# Patient Record
Sex: FEMALE | Race: WHITE | Employment: UNEMPLOYED | URBAN - METROPOLITAN AREA
[De-identification: names, ages, dates, MRNs, and addresses within clinical notes are randomized per-mention and may not be internally consistent; named-entity substitution may affect disease eponyms.]

---

## 2018-09-21 ENCOUNTER — HOSPITAL ENCOUNTER (OUTPATIENT)
Dept: MAMMOGRAPHY | Age: 48
Discharge: HOME | End: 2018-09-26
Payer: COMMERCIAL

## 2018-09-21 DIAGNOSIS — Z12.31 VISIT FOR SCREENING MAMMOGRAM: ICD-10-CM

## 2018-09-21 PROCEDURE — 77067 SCR MAMMO BI INCL CAD: CPT

## 2020-11-21 ENCOUNTER — HOSPITAL ENCOUNTER (OUTPATIENT)
Dept: MAMMOGRAPHY | Age: 50
Discharge: HOME | End: 2020-11-21
Payer: COMMERCIAL

## 2020-11-21 PROCEDURE — 77063 BREAST TOMOSYNTHESIS BI: CPT

## 2020-12-03 ENCOUNTER — HOSPITAL ENCOUNTER (OUTPATIENT)
Dept: MAMMOGRAPHY | Age: 50
Discharge: HOME | End: 2020-12-03
Payer: COMMERCIAL

## 2020-12-03 ENCOUNTER — HOSPITAL ENCOUNTER (OUTPATIENT)
Dept: ULTRASOUND IMAGING | Age: 50
Discharge: HOME | End: 2020-12-03
Payer: COMMERCIAL

## 2020-12-03 PROCEDURE — G0279 TOMOSYNTHESIS, MAMMO: HCPCS

## 2020-12-03 PROCEDURE — 76882 US LMTD JT/FCL EVL NVASC XTR: CPT

## 2020-12-03 PROCEDURE — 76642 ULTRASOUND BREAST LIMITED: CPT

## 2022-05-16 ENCOUNTER — HOSPITAL ENCOUNTER (OUTPATIENT)
Dept: ULTRASOUND IMAGING | Age: 52
Discharge: HOME | End: 2022-05-16
Payer: COMMERCIAL

## 2022-05-16 DIAGNOSIS — R92.8 ABNORMAL MAMMOGRAM: ICD-10-CM

## 2022-05-16 PROCEDURE — 76882 US LMTD JT/FCL EVL NVASC XTR: CPT

## 2022-05-23 ENCOUNTER — HOSPITAL ENCOUNTER (OUTPATIENT)
Dept: MAMMOGRAPHY | Age: 52
Discharge: HOME | End: 2022-05-23
Payer: COMMERCIAL

## 2022-05-23 ENCOUNTER — HOSPITAL ENCOUNTER (OUTPATIENT)
Dept: ULTRASOUND IMAGING | Age: 52
Discharge: HOME | End: 2022-05-23
Payer: COMMERCIAL

## 2022-05-23 DIAGNOSIS — R92.8 ABNORMAL MAMMOGRAM OF RIGHT BREAST: ICD-10-CM

## 2022-05-23 DIAGNOSIS — R22.31 MASS OF RIGHT AXILLA: ICD-10-CM

## 2022-05-23 PROCEDURE — 2709999900 US BIOPSY LYMPH NODE

## 2022-05-23 PROCEDURE — 88341 IMHCHEM/IMCYTCHM EA ADD ANTB: CPT

## 2022-05-23 PROCEDURE — 88360 TUMOR IMMUNOHISTOCHEM/MANUAL: CPT

## 2022-05-23 PROCEDURE — 76942 ECHO GUIDE FOR BIOPSY: CPT

## 2022-05-23 PROCEDURE — 88342 IMHCHEM/IMCYTCHM 1ST ANTB: CPT

## 2022-05-23 PROCEDURE — 88305 TISSUE EXAM BY PATHOLOGIST: CPT

## 2022-05-23 PROCEDURE — 77065 DX MAMMO INCL CAD UNI: CPT

## 2022-05-23 PROCEDURE — 88365 INSITU HYBRIDIZATION (FISH): CPT

## 2022-06-01 ENCOUNTER — TELEPHONE (OUTPATIENT)
Dept: BREAST CENTER | Age: 52
End: 2022-06-01

## 2022-06-01 ENCOUNTER — INITIAL CONSULT (OUTPATIENT)
Dept: BREAST CENTER | Age: 52
End: 2022-06-01
Payer: COMMERCIAL

## 2022-06-01 VITALS
HEART RATE: 62 BPM | DIASTOLIC BLOOD PRESSURE: 49 MMHG | WEIGHT: 150 LBS | SYSTOLIC BLOOD PRESSURE: 106 MMHG | HEIGHT: 69 IN | BODY MASS INDEX: 22.22 KG/M2

## 2022-06-01 DIAGNOSIS — R59.0 LYMPHADENOPATHY, AXILLARY: Primary | ICD-10-CM

## 2022-06-01 PROCEDURE — 99204 OFFICE O/P NEW MOD 45 MIN: CPT | Performed by: SURGERY

## 2022-06-01 NOTE — LETTER
CONSENT TO OPERATION  AND/OR OTHER PROCEDURE(S)          PATIENT : Terese Ramirez OF BIRTH:  1970      DATE : 6/1/22          1. I request and consent that Dr. Gayathri Stafford,  and/or his associates or assistants perform an operation and/or procedures on the above patient at  [facility] [place], to treat the condition(s) which appear indicated by the diagnostic studies already performed. I have been told that in general terms the nature, purpose and reasonable expectations of the operation and/or procedure(s) are:       Right Axillary Lymph Node Excisional Biopsy        2. It has been explained to me by the informing physician that during the course of the operation and/or procedure(s) unforeseen conditions may be revealed that necessitate an extension of the original operation and/or procedure(s) or different operation and/or procedures than those set forth in Paragraph 1. I therefore authorize and request that my physician and/or his associates or assistants perform such operations and/or procedures as are necessary and desirable in the exercise of professional judgment. The authority granted under this Paragraph 2 shall extend to all conditions that require treatment and are known to my physician at the time the operation is commenced. 3. I have been made aware by the informing physician of certain risks and consequences that are associated with the operation and/or procedure(s) described in Paragraph 1, the reasonable alternative methods or treatment, the possible consequences, the possibility that the operation and/or procedure(s) may be unsuccessful and the possibility of complications.   I understand the reasonably known risks to be:      - Bleeding  - Infection/COVID 19  - Poor Healing  - Possible Damage to Nerve, Vessel, Tendon/Muscle or Bone  - Possible need for additional Treatment/Surgery/Re Excision  - Stiffness  - Persistent Pain   - Residual or Recurrent Symptoms  - Anesthetic and/or Medical Risks  - Possible failed operation  - Lymphedema/swelling  - Nipple loss  - Numbness or nerve injury  - Unappealing cosmetics              4. I have also been informed by the informing physician that there are other risks from both known and unknown causes that are attendant to the performance of any surgical procedure. I am aware that the practice of medicine and surgery is not an exact science, and that no guarantees have been made to me concerning the results of the operation and/or procedure(s). 5. I   CONSENT / REFUSE CONSENT  (strike the phrase that does not apply) to the taking of photographs before, during and/or after the operation or procedure for scientific/educational purposes. 6. I consent to the administration of anesthesia and to the use of such anesthetics as may be deemed advisable by the anesthesiologist who has been engaged by me or my physician. 7. I certify that I have read and understand the above consent to operation and/or other procedure(s); that the explanations therein referred to were made to me by the informing physician in advance of my signing this consent; that all blanks or statements requiring insertion or completion were filled in and inapplicable paragraphs, if any, were stricken before I signed; and that all questions asked by me about the operation and/or procedure(s) which I have consented to have been fully answered in a satisfactory manner.               _______________________  Witness        Signature Of Patient          _______________________  Informing Physician         Signature of Informing Physician           If patient is unable to sign or is a minor, complete one of the following:    (A)  Patient is a minor   years of age.     (B)  Patient is unable to sign because:

## 2022-06-01 NOTE — TELEPHONE ENCOUNTER
Breast History:  History of Previous Breast Biopsy:yes  Self Breast Exams Completed:yes-  Family History of Breast Cancer:  yes-mother breast cancer age of 76    Family History of Other Cancers:no-   Ashkenazi Yarsani Decent:no  Bra Size: 39 A    Gyne History:  : 3,   Para: 3  Age of Menarche: 16 years  Age of Menopause: No years   Age of first live Birth: 30 years  History of Hysterectomy / JENNIFER-BSO: No  History of OCP's/HRT:No   When and how long:  NO  Family History or personal history of Ovarian Cancer: no

## 2022-06-01 NOTE — PATIENT INSTRUCTIONS
Exam reveals right axillary mass  Procedure for excision was explained to patient  Patient to schedule history and physical with primary care physician for surgical clearance  Risks and benefits of surgery were explained  Consent for surgery was signed

## 2022-06-10 ENCOUNTER — TELEPHONE (OUTPATIENT)
Dept: SURGERY | Age: 52
End: 2022-06-10

## 2022-06-10 DIAGNOSIS — R59.0 LYMPHADENOPATHY, AXILLARY: Primary | ICD-10-CM

## 2022-06-10 PROCEDURE — 38525 BIOPSY/REMOVAL LYMPH NODES: CPT | Performed by: SURGERY

## 2022-06-10 NOTE — TELEPHONE ENCOUNTER
Spoke to Edmond. Notified her surgery is scheduled for 7/7/2022 @0730 arrive at [address] [place] [address] after midnight , no blood thinners or NSAIDS 5 days prior to surgery. Instructed to call with any questions or concerns prior to surgery. Pre-op physical is scheduled for 6/20/2022.       Thanks, Juanjose Soto

## 2022-06-15 ENCOUNTER — TELEPHONE (OUTPATIENT)
Dept: BREAST CENTER | Age: 52
End: 2022-06-15

## 2022-06-15 NOTE — TELEPHONE ENCOUNTER
Left a message to return my call. I scheduled pt for her post op appt on 7/27/22 @ 3151. Please verify date and time will work for pt.       Thanks, Jones Alcaraz

## 2022-06-17 NOTE — TELEPHONE ENCOUNTER
Called and spoke to patient. Informed her of her 07/27/22 post-op appointment in [facility] with Dr Aaliyah Saha @ 12:30. Patient informed me that if she needs to reschedule her appointment that she will call back to do so.

## 2022-07-01 RX ORDER — CHOLECALCIFEROL (VITAMIN D3) 125 MCG
1 CAPSULE ORAL PRN
COMMUNITY

## 2022-07-01 NOTE — PROGRESS NOTES
Preoperative Screening for Elective Surgery/Invasive Procedures While COVID-19 present in the community     1. Have you tested positive or have been told to self-isolate for COVID-19 like symptoms within the past 28 days? 2. Do you currently have any of the following symptoms? ? Fever >100.0 F or 99.9 F in immunocompromised patients? ? New onset cough, shortness of breath or difficulty breathing? ? New onset sore throat, myalgia (muscle aches and pains), headache, loss of taste/smell or diarrhea? 3. Have you had a potential exposure to COVID-19 within the past 14 days by:  ? Close contact with a confirmed case? ? Close contact with a healthcare worker,  or essential infrastructure worker (grocery store, [employer], gas station, public utilities or transportation)? ? Do you reside in a congregate setting such as; skilled nursing facility, adult home, correctional facility, homeless shelter or other institutional setting? ? Have you had recent travel to a known COVID-19 hotspot? No to all above       * Admitted with diarrhea? [] YES    [x]  NO     *Prior history of C-Diff. In last 3 months? [] YES    [x]  NO     *Antibiotic use in the past 6-8 weeks? [x]  NO    []  YES      If yes, which: REASON_________________     *Prior hospitalization or nursing home in the last month? []  YES    [x]  NO     SAFETY FIRST. .call before you fall    [facility] time_7/7/22 0600___________        Surgery time___0730_________    Do not eat or drink anything after 12:00 midnight prior to your surgery. This includes water chewing gum, mints and ice chips- the Day of Surgery. You may brush your teeth and gargle the morning of your surgery, but do not swallow the water     Please see your family doctor/pediatrician for a history and physical and/or questions concerning medications.    Bring any test results/reports from your physicians office. If you are under the care of a heart doctor or specialist doctor, please be aware that you may be asked to them for clearance    You may be asked to stop blood thinners such as Coumadin, Plavix, Fragmin, Lovenox, etc., or any anti-inflammatories such as:  Aspirin, Ibuprofen, Advil, Naproxen prior to your surgery. We also ask that you stop any OTC medications such as fish oil, vitamin E, glucosamine, garlic, Multivitamins, COQ 10, etc.    We ask that you do not smoke 24 hours prior to surgery  We ask that you do not  drink any alcoholic beverages 24 hours prior to surgery     You must make arrangements for a responsible adult to take you home after your surgery. For your safety you will not be allowed to leave alone or drive yourself home. Your surgery will be cancelled if you do not have a ride home. Also for your safety, it is strongly suggested that someone stay with you the first 24 hours after your surgery. A parent or legal guardian must accompany a child scheduled for surgery and plan to stay at the hospital until the child is discharged. Please do not bring other children with you. For your comfort, please wear simple loose fitting clothing to the hospital.  Please do not bring valuables. Do not wear any make-up or nail polish on your fingers or toes. For your safety, please do not wear any jewelry or body piercing's on the day of surgery. All jewelry must be removed. If you have dentures, they will be removed before going to operating room. For your convenience, we will provide you with a container. If you wear contact lenses or glasses, they will be removed, please bring a case for them. If you have a living will and a durable power of  for healthcare, please bring in a copy.      As part of our patient safety program to minimize surgical site infections, we ask you to do the following:    · Please notify your surgeon if you develop any illness between now and the day of your surgery. · This includes a cough, cold, fever, sore throat, nausea,         or vomiting, and diarrhea, etc.  ·  Please notify your surgeon if you experience dizziness, shortness         of breath or blurred vision between now and the time of your surgery. Do not shave your operative site 96 hours prior to surgery. For face and neck surgery, men may use an electric razor 48 hours   prior to surgery. You may shower the night before surgery or the morning of   your surgery with an antibacterial soap. You will need to bring a photo ID and insurance card     If you use a C-pap or Bi-pap machine, please bring your machine with you to the hospital     Our goal is to provide you with excellent care, therefore, visitors will be limited to so that we may focus on providing this care for you. Please contact your surgeon office, if you have any further questions. [facility] phone number:  [address] [facility] fax number:  [phone]    Please note these are generalized instructions for all surgical cases, you may be provided with more specific instructions according to your surgery.

## 2022-07-06 ENCOUNTER — ANESTHESIA EVENT (OUTPATIENT)
Dept: OPERATING ROOM | Age: 52
End: 2022-07-06
Payer: COMMERCIAL

## 2022-07-07 ENCOUNTER — HOSPITAL ENCOUNTER (OUTPATIENT)
Age: 52
Setting detail: OUTPATIENT SURGERY
Discharge: HOME | End: 2022-07-07
Attending: SURGERY | Admitting: SURGERY
Payer: COMMERCIAL

## 2022-07-07 ENCOUNTER — ANESTHESIA (OUTPATIENT)
Dept: OPERATING ROOM | Age: 52
End: 2022-07-07
Payer: COMMERCIAL

## 2022-07-07 VITALS
HEIGHT: 69 IN | BODY MASS INDEX: 21.68 KG/M2 | DIASTOLIC BLOOD PRESSURE: 61 MMHG | TEMPERATURE: 97.8 F | SYSTOLIC BLOOD PRESSURE: 113 MMHG | HEART RATE: 60 BPM | RESPIRATION RATE: 14 BRPM | OXYGEN SATURATION: 99 % | WEIGHT: 146.39 LBS

## 2022-07-07 DIAGNOSIS — R59.0 LYMPHADENOPATHY, AXILLARY: Primary | ICD-10-CM

## 2022-07-07 DIAGNOSIS — R59.0 AXILLARY LYMPHADENOPATHY: ICD-10-CM

## 2022-07-07 PROCEDURE — 2500000003 HC RX 250 WO HCPCS: Performed by: SURGERY

## 2022-07-07 PROCEDURE — 3700000000 HC ANESTHESIA ATTENDED CARE: Performed by: SURGERY

## 2022-07-07 PROCEDURE — 2500000003 HC RX 250 WO HCPCS

## 2022-07-07 PROCEDURE — 38525 BIOPSY/REMOVAL LYMPH NODES: CPT | Performed by: SURGERY

## 2022-07-07 PROCEDURE — 2580000003 HC RX 258: Performed by: SURGERY

## 2022-07-07 PROCEDURE — 3600000002 HC SURGERY LEVEL 2 BASE: Performed by: SURGERY

## 2022-07-07 PROCEDURE — 88184 FLOWCYTOMETRY/ TC 1 MARKER: CPT

## 2022-07-07 PROCEDURE — 88305 TISSUE EXAM BY PATHOLOGIST: CPT

## 2022-07-07 PROCEDURE — 2580000003 HC RX 258: Performed by: ANESTHESIOLOGY

## 2022-07-07 PROCEDURE — 6370000000 HC RX 637 (ALT 250 FOR IP): Performed by: SURGERY

## 2022-07-07 PROCEDURE — 6360000002 HC RX W HCPCS

## 2022-07-07 PROCEDURE — A4217 STERILE WATER/SALINE, 500 ML: HCPCS | Performed by: SURGERY

## 2022-07-07 PROCEDURE — 7100000000 HC PACU RECOVERY - FIRST 15 MIN: Performed by: SURGERY

## 2022-07-07 PROCEDURE — 7100000011 HC PHASE II RECOVERY - ADDTL 15 MIN: Performed by: SURGERY

## 2022-07-07 PROCEDURE — 7100000001 HC PACU RECOVERY - ADDTL 15 MIN: Performed by: SURGERY

## 2022-07-07 PROCEDURE — 2580000003 HC RX 258

## 2022-07-07 PROCEDURE — 3700000001 HC ADD 15 MINUTES (ANESTHESIA): Performed by: SURGERY

## 2022-07-07 PROCEDURE — 2720000010 HC SURG SUPPLY STERILE: Performed by: SURGERY

## 2022-07-07 PROCEDURE — 2709999900 HC NON-CHARGEABLE SUPPLY: Performed by: SURGERY

## 2022-07-07 PROCEDURE — 3600000012 HC SURGERY LEVEL 2 ADDTL 15MIN: Performed by: SURGERY

## 2022-07-07 PROCEDURE — 6360000002 HC RX W HCPCS: Performed by: SURGERY

## 2022-07-07 PROCEDURE — 88185 FLOWCYTOMETRY/TC ADD-ON: CPT

## 2022-07-07 PROCEDURE — 7100000010 HC PHASE II RECOVERY - FIRST 15 MIN: Performed by: SURGERY

## 2022-07-07 RX ORDER — PROPOFOL 10 MG/ML
INJECTION, EMULSION INTRAVENOUS PRN
Status: DISCONTINUED | OUTPATIENT
Start: 2022-07-07 | End: 2022-07-07 | Stop reason: SDUPTHER

## 2022-07-07 RX ORDER — SODIUM CHLORIDE 9 MG/ML
INJECTION, SOLUTION INTRAVENOUS CONTINUOUS
Status: DISCONTINUED | OUTPATIENT
Start: 2022-07-07 | End: 2022-07-07 | Stop reason: HOSPADM

## 2022-07-07 RX ORDER — HYDROCODONE BITARTRATE AND ACETAMINOPHEN 5; 325 MG/1; MG/1
1 TABLET ORAL EVERY 6 HOURS PRN
Qty: 5 TABLET | Refills: 0 | Status: SHIPPED | OUTPATIENT
Start: 2022-07-07 | End: 2022-07-10

## 2022-07-07 RX ORDER — FENTANYL CITRATE 50 UG/ML
25 INJECTION, SOLUTION INTRAMUSCULAR; INTRAVENOUS EVERY 5 MIN PRN
Status: CANCELLED | OUTPATIENT
Start: 2022-07-07

## 2022-07-07 RX ORDER — OXYCODONE HYDROCHLORIDE 5 MG/1
5 TABLET ORAL
Status: CANCELLED | OUTPATIENT
Start: 2022-07-07 | End: 2022-07-07

## 2022-07-07 RX ORDER — SODIUM CHLORIDE 0.9 % (FLUSH) 0.9 %
5-40 SYRINGE (ML) INJECTION PRN
Status: CANCELLED | OUTPATIENT
Start: 2022-07-07

## 2022-07-07 RX ORDER — BUPIVACAINE HYDROCHLORIDE 5 MG/ML
INJECTION, SOLUTION EPIDURAL; INTRACAUDAL
Status: COMPLETED | OUTPATIENT
Start: 2022-07-07 | End: 2022-07-07

## 2022-07-07 RX ORDER — DROPERIDOL 2.5 MG/ML
0.62 INJECTION, SOLUTION INTRAMUSCULAR; INTRAVENOUS
Status: CANCELLED | OUTPATIENT
Start: 2022-07-07 | End: 2022-07-07

## 2022-07-07 RX ORDER — SODIUM CHLORIDE 9 MG/ML
INJECTION, SOLUTION INTRAVENOUS PRN
Status: DISCONTINUED | OUTPATIENT
Start: 2022-07-07 | End: 2022-07-07 | Stop reason: HOSPADM

## 2022-07-07 RX ORDER — ONDANSETRON 2 MG/ML
4 INJECTION INTRAMUSCULAR; INTRAVENOUS
Status: CANCELLED | OUTPATIENT
Start: 2022-07-07 | End: 2022-07-07

## 2022-07-07 RX ORDER — SODIUM CHLORIDE 0.9 % (FLUSH) 0.9 %
5-40 SYRINGE (ML) INJECTION PRN
Status: DISCONTINUED | OUTPATIENT
Start: 2022-07-07 | End: 2022-07-07 | Stop reason: HOSPADM

## 2022-07-07 RX ORDER — ONDANSETRON 2 MG/ML
INJECTION INTRAMUSCULAR; INTRAVENOUS PRN
Status: DISCONTINUED | OUTPATIENT
Start: 2022-07-07 | End: 2022-07-07 | Stop reason: SDUPTHER

## 2022-07-07 RX ORDER — SODIUM CHLORIDE 0.9 % (FLUSH) 0.9 %
5-40 SYRINGE (ML) INJECTION EVERY 12 HOURS SCHEDULED
Status: DISCONTINUED | OUTPATIENT
Start: 2022-07-07 | End: 2022-07-07 | Stop reason: HOSPADM

## 2022-07-07 RX ORDER — SUCCINYLCHOLINE/SOD CL,ISO/PF 200MG/10ML
SYRINGE (ML) INTRAVENOUS PRN
Status: DISCONTINUED | OUTPATIENT
Start: 2022-07-07 | End: 2022-07-07 | Stop reason: SDUPTHER

## 2022-07-07 RX ORDER — ACETAMINOPHEN 325 MG/1
650 TABLET ORAL
Status: COMPLETED | OUTPATIENT
Start: 2022-07-07 | End: 2022-07-07

## 2022-07-07 RX ORDER — MAGNESIUM HYDROXIDE 1200 MG/15ML
LIQUID ORAL CONTINUOUS PRN
Status: DISCONTINUED | OUTPATIENT
Start: 2022-07-07 | End: 2022-07-07 | Stop reason: HOSPADM

## 2022-07-07 RX ORDER — LIDOCAINE HYDROCHLORIDE 20 MG/ML
INJECTION, SOLUTION EPIDURAL; INFILTRATION; INTRACAUDAL; PERINEURAL PRN
Status: DISCONTINUED | OUTPATIENT
Start: 2022-07-07 | End: 2022-07-07 | Stop reason: SDUPTHER

## 2022-07-07 RX ORDER — SODIUM CHLORIDE 9 MG/ML
INJECTION, SOLUTION INTRAVENOUS PRN
Status: CANCELLED | OUTPATIENT
Start: 2022-07-07

## 2022-07-07 RX ORDER — DEXAMETHASONE SODIUM PHOSPHATE 4 MG/ML
INJECTION, SOLUTION INTRA-ARTICULAR; INTRALESIONAL; INTRAMUSCULAR; INTRAVENOUS; SOFT TISSUE PRN
Status: DISCONTINUED | OUTPATIENT
Start: 2022-07-07 | End: 2022-07-07 | Stop reason: SDUPTHER

## 2022-07-07 RX ORDER — FENTANYL CITRATE 50 UG/ML
INJECTION, SOLUTION INTRAMUSCULAR; INTRAVENOUS PRN
Status: DISCONTINUED | OUTPATIENT
Start: 2022-07-07 | End: 2022-07-07 | Stop reason: SDUPTHER

## 2022-07-07 RX ORDER — SODIUM CHLORIDE 0.9 % (FLUSH) 0.9 %
5-40 SYRINGE (ML) INJECTION EVERY 12 HOURS SCHEDULED
Status: CANCELLED | OUTPATIENT
Start: 2022-07-07

## 2022-07-07 RX ORDER — MIDAZOLAM HYDROCHLORIDE 1 MG/ML
INJECTION INTRAMUSCULAR; INTRAVENOUS PRN
Status: DISCONTINUED | OUTPATIENT
Start: 2022-07-07 | End: 2022-07-07 | Stop reason: SDUPTHER

## 2022-07-07 RX ADMIN — PHENYLEPHRINE HYDROCHLORIDE 100 MCG: 10 INJECTION INTRAVENOUS at 07:44

## 2022-07-07 RX ADMIN — CEFAZOLIN 2000 MG: 2 INJECTION, POWDER, FOR SOLUTION INTRAMUSCULAR; INTRAVENOUS at 07:40

## 2022-07-07 RX ADMIN — PROPOFOL 70 MG: 10 INJECTION, EMULSION INTRAVENOUS at 07:38

## 2022-07-07 RX ADMIN — FENTANYL CITRATE 25 MCG: 50 INJECTION INTRAMUSCULAR; INTRAVENOUS at 07:48

## 2022-07-07 RX ADMIN — DEXAMETHASONE SODIUM PHOSPHATE 12 MG: 4 INJECTION, SOLUTION INTRAMUSCULAR; INTRAVENOUS at 07:42

## 2022-07-07 RX ADMIN — ONDANSETRON 4 MG: 2 INJECTION INTRAMUSCULAR; INTRAVENOUS at 07:42

## 2022-07-07 RX ADMIN — SODIUM CHLORIDE: 9 INJECTION, SOLUTION INTRAVENOUS at 06:30

## 2022-07-07 RX ADMIN — MIDAZOLAM 2 MG: 1 INJECTION INTRAMUSCULAR; INTRAVENOUS at 07:29

## 2022-07-07 RX ADMIN — PROPOFOL 180 MG: 10 INJECTION, EMULSION INTRAVENOUS at 07:34

## 2022-07-07 RX ADMIN — Medication 120 MG: at 07:38

## 2022-07-07 RX ADMIN — FENTANYL CITRATE 25 MCG: 50 INJECTION INTRAMUSCULAR; INTRAVENOUS at 07:34

## 2022-07-07 RX ADMIN — ACETAMINOPHEN 650 MG: 325 TABLET ORAL at 06:38

## 2022-07-07 RX ADMIN — LIDOCAINE HYDROCHLORIDE 60 MG: 20 INJECTION, SOLUTION EPIDURAL; INFILTRATION; INTRACAUDAL; PERINEURAL at 07:34

## 2022-07-07 ASSESSMENT — PAIN - FUNCTIONAL ASSESSMENT: PAIN_FUNCTIONAL_ASSESSMENT: 0-10

## 2022-07-07 ASSESSMENT — ENCOUNTER SYMPTOMS: SHORTNESS OF BREATH: 0

## 2022-07-07 ASSESSMENT — PAIN SCALES - WONG BAKER: WONGBAKER_NUMERICALRESPONSE: 0

## 2022-07-07 NOTE — ANESTHESIA POSTPROCEDURE EVALUATION
Department of Anesthesiology  Postprocedure Note    Patient: Georges Sahni  MRN: [id number]  YOB: 1970  Date of evaluation: 7/7/2022      Procedure Summary     Date: 07/07/22 Room / Location: [address]    Anesthesia Start: 7069 Anesthesia Stop: 2373    Procedure: RIGHT AXILLARY LYMPH NODE EXCISIONAL BIOPSY (Right Axilla) Diagnosis:       Axillary lymphadenopathy      (RIGHT AXILLARY LYMPHADENOPATHY)    Surgeons: Andrew Orozco MD Responsible Provider: Mario Torres MD    Anesthesia Type: general ASA Status: 2          Anesthesia Type: No value filed.     Sosa Phase I: Sosa Score: 10    Sosa Phase II: Sosa Score: 10      Anesthesia Post Evaluation    Patient location during evaluation: PACU  Patient participation: complete - patient participated  Level of consciousness: awake  Airway patency: patent  Nausea & Vomiting: no nausea and no vomiting  Cardiovascular status: blood pressure returned to baseline  Respiratory status: acceptable  Hydration status: stable  Multimodal analgesia pain management approach

## 2022-07-07 NOTE — PROGRESS NOTES
PACU Transfer Note    Vitals:    07/07/22 0840   BP: (!) 118/56   Pulse: 56   Resp: 13   Temp: 97.8 °F (36.6 °C)   SpO2: 100%       In: 650 [I.V.:600]  Out: 5     Pain assessment:  none    Report given to Receiving unit RN.    7/7/2022 8:43 AM      Electronically signed by Мария Herring RN on 7/7/2022 at 8:43 AM

## 2022-07-07 NOTE — OP NOTE
Operative Note      Patient: Yanely Aaron  YOB: 1970  MRN: [id number]    Date of Procedure: 7/7/2022    Pre-Op Diagnosis: RIGHT AXILLARY LYMPHADENOPATHY    Post-Op Diagnosis: Same       Procedure(s):  RIGHT AXILLARY LYMPH NODE EXCISIONAL BIOPSY    Surgeon(s): Sita Padron MD    Assistant:   Surgical Assistant: Chasity Mederos RN    Anesthesia: General    Estimated Blood Loss (mL): Minimal    Complications: None    Specimens:   ID Type Source Tests Collected by Time Destination   A : right axillary  mass Tissue Breast SURGICAL PATHOLOGY Sita Padron MD 7/7/2022 7685        Implants:  * No implants in log *      Drains: * No LDAs found *    Findings: see op note    Detailed Description of Procedure:   Operative Report      Name:  Yanely Aaron   MRN:  [id number]  Date:  7/7/2022        PREOPERATIVE DIAGNOSIS: right axillary lymph node    POSTOPERATIVE DIAGNOSIS: same    PROCEDURE:right axillary lymph node excisional biopsy    SURGEON: Gabriella    ESTIMATED BLOOD LOSS:  Less than 25 mL    ASSISTANT: DAVID Obrien    ANESTHESIA: General    INDICATIONS FOR PROCEDURE: The patient is a 47 y.o. female who had  presented with a palpable right axillary lymph node, and needle biopsy was benign, but the node persists. She is here now for excision of the node. The risks, benefits and alternatives were discussed with the  patient. Questions were answered and she is agreeable to proceed. Ms. Jolly Duvall was met by me in the preoperative area. The surgical sites were identified. Consent was obtained. The appropriate breast imaging was reviewed. DESCRIPTION OF OPERATION: The patient was brought to the operating room and  placed on the OR table in the supine position. She was given general anesthesia and the right axillary region was prepped and draped in the usual sterile fashion. The area was injected with local anesthetic.  A transverse incision was made in the lower axilla on the right axillary region and carried down through the skin and subcutaneous tissues. The axillary fascia was divided, and I entered the deep shell tissue underneath. The enlarged node was identified, and this was dissected free from the surrounding tissue using the Harmonic scalpel and sent to pathology for permanent section. There was some thickened tissue in the subq tissues that was also removed and sent to pathology. The wound was irrigated and injected with local anesthetic, and  closed with a deep layer of vicryl and skin was re-approximated with 4-0 Monocryl and covered with surgical glue. Sponge, needle and instrument counts were correct per nursing. The patient tolerated the procedure well. She was taken to the  recovery room in stable condition.       Electronically signed by Jessica Burnett MD on 7/7/2022 at 8:07 AM

## 2022-07-07 NOTE — H&P
Update History & Physical    The patient's History and Physical of June 27, 2022 was reviewed with the patient and I examined the patient. There was no change. The surgical site was confirmed by the patient and me. Plan: The risks, benefits, expected outcome, and alternative to the recommended procedure have been discussed with the patient. Patient understands and wants to proceed with the procedure.      Electronically signed by Kristine Mcfadden MD on 7/7/2022 at 7:26 AM

## 2022-07-07 NOTE — PROGRESS NOTES
Patient admitted to PACU # 9 from OR at 0821 post RIGHT AXILLARY LYMPH NODE EXCISIONAL BIOPSY - Right   per Gabriella. Attached to PACU monitoring system and report received from anesthesia provider. Patient was reported to be hemodynamically stable during procedure. Patient drowsy on admission.        Electronically signed by Renea Flores RN on 7/7/2022 at 8:27 AM

## 2022-07-07 NOTE — PROGRESS NOTES
Transfers to chair. Sips water and snacks. Denies pain at present.   Family and pharmacy notified of patient arrival.

## 2022-07-07 NOTE — PROGRESS NOTES
Prescription sent to retail pharmacy to be filled.          Electronically signed by Indu Vásquez RN on 7/7/2022 at 8:30 AM

## 2022-07-07 NOTE — ANESTHESIA PRE PROCEDURE
Department of Anesthesiology  Preprocedure Note       Name:  Stone Oseguera   Age:  47 y.o.  :  1970                                          MRN:  [id number]         Date:  2022      Surgeon: Lissa Arreola): Yanira Pak MD    Procedure: Procedure(s):  RIGHT AXILLARY LYMPH NODE EXCISIONAL BIOPSY    Medications prior to admission:   Prior to Admission medications    Medication Sig Start Date End Date Taking?  Authorizing Provider   lactase (LACTAID) 3000 units tablet Take 1 tablet by mouth as needed Indications: when drinks milk   Yes Historical Provider, MD       Current medications:    Current Facility-Administered Medications   Medication Dose Route Frequency Provider Last Rate Last Admin    ceFAZolin (ANCEF) 2,000 mg in dextrose 5 % 50 mL IVPB (mini-bag)  2,000 mg IntraVENous Once Yanira Pak MD        0.9 % sodium chloride infusion   IntraVENous Continuous Claudia Davalos  mL/hr at 22 0630 New Bag at 22 0630    sodium chloride flush 0.9 % injection 5-40 mL  5-40 mL IntraVENous 2 times per day Claudia Davalos MD        sodium chloride flush 0.9 % injection 5-40 mL  5-40 mL IntraVENous PRN Claudia Davalos MD        0.9 % sodium chloride infusion   IntraVENous PRN Claudia Davalos MD           Allergies:  No Known Allergies    Problem List:    Patient Active Problem List   Diagnosis Code    Lymphadenopathy, axillary R59.0       Past Medical History:        Diagnosis Date    Dysphagia 2009    Hypersomnia 2019    Kidney stone     Lumbago 2009    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    Vitamin D deficiency        Past Surgical History:        Procedure Laterality Date    BREAST SURGERY Right     biopsy     US LYMPH NODE BIOPSY  2022    US LYMPH NODE BIOPSY 2022 [address] [facility] ULTRASOUND       Social History:    Social History     Tobacco Use    Smoking status: Never Smoker    Smokeless tobacco: Never Used   Substance Use Topics    Alcohol use: Not Currently Counseling given: Not Answered      Vital Signs (Current):   Vitals:    07/01/22 1343 07/07/22 0622   BP:  (!) 109/54   Pulse:  53   Resp:  16   Temp:  97 °F (36.1 °C)   TempSrc:  Temporal   SpO2:  98%   Weight: 150 lb (68 kg) 146 lb 6.2 oz (66.4 kg)   Height: 5' 9\" (1.753 m) 5' 9\" (1.753 m)                                              BP Readings from Last 3 Encounters:   07/07/22 (!) 109/54   06/01/22 (!) 106/49       NPO Status: Time of last liquid consumption: 2300                        Time of last solid consumption: 2200                        Date of last liquid consumption: 07/06/22                        Date of last solid food consumption: 07/06/22    BMI:   Wt Readings from Last 3 Encounters:   07/07/22 146 lb 6.2 oz (66.4 kg)   06/01/22 150 lb (68 kg)     Body mass index is 21.62 kg/m². CBC: No results found for: WBC, RBC, HGB, HCT, MCV, RDW, PLT    CMP: No results found for: NA, K, CL, CO2, BUN, CREATININE, GFRAA, AGRATIO, LABGLOM, GLUCOSE, GLU, PROT, CALCIUM, BILITOT, ALKPHOS, AST, ALT    POC Tests: No results for input(s): POCGLU, POCNA, POCK, POCCL, POCBUN, POCHEMO, POCHCT in the last 72 hours.     Coags: No results found for: PROTIME, INR, APTT    HCG (If Applicable): No results found for: PREGTESTUR, PREGSERUM, HCG, HCGQUANT     ABGs: No results found for: PHART, PO2ART, PWA6UJA, YWI5TKH, BEART, K2WXPKRO     Type & Screen (If Applicable):  No results found for: LABABO, LABRH    Drug/Infectious Status (If Applicable):  No results found for: HIV, HEPCAB    COVID-19 Screening (If Applicable): No results found for: COVID19        Anesthesia Evaluation  Patient summary reviewed and Nursing notes reviewed no history of anesthetic complications:   Airway: Mallampati: I  TM distance: >3 FB   Neck ROM: full  Mouth opening: > = 3 FB   Dental: normal exam         Pulmonary: breath sounds clear to auscultation      (-) pneumonia, asthma, shortness of breath, recent URI and sleep apnea                           Cardiovascular:  Exercise tolerance: good (>4 METS),       (-) hypertension, valvular problems/murmurs, past MI, CAD, CABG/stent, dysrhythmias,  angina,  PARKER and no pulmonary hypertension      Rhythm: regular  Rate: normal                    Neuro/Psych:      (-) seizures, TIA and CVA           GI/Hepatic/Renal:        (-) liver disease and no renal disease (Hx of kidney stones)       Endo/Other:        (-) diabetes mellitus, hypothyroidism               Abdominal:             Vascular:     - PVD, DVT and PE. Other Findings:           Anesthesia Plan      general     ASA 2     (Genearl LMA, PIV, multimodal analgesia, PACU. No contraindications exist to LMA. I discussed with the patient the risks and benefits to general anesthesia including possible anesthetic complications but not limited to: PONV, damage to the airway and surrounding structures (teeth, lips, gums, tongue, etc.), adverse reactions to medications, cardiac complications (MI, CHF, arrhythmias, etc.), respiratory complications (post-op ventilation, respiratory failure, etc.), neurologic complications (nerve damage, stroke, seizure), the potential for conversion to a general anesthetic, and death. The patient was given the opportunity to ask questions and all questions were answered to the patient's satisfaction.  )  Induction: intravenous. MIPS: Postoperative opioids intended and Prophylactic antiemetics administered. Anesthetic plan and risks discussed with patient. Plan discussed with CRNA. This pre-anesthesia assessment may be used as a history and physical.    DOS STAFF ADDENDUM:    Pt seen and examined, chart reviewed (including anesthesia, drug and allergy history). No interval changes to history and physical examination. Anesthetic plan, risks, benefits, alternatives, and personnel involved discussed with patient. Patient verbalized an understanding and agrees to proceed. Bella Cockayne, MD  July 7, 2022  6:43 AM

## 2022-07-08 ENCOUNTER — TELEPHONE (OUTPATIENT)
Dept: BREAST CENTER | Age: 52
End: 2022-07-08

## 2022-07-08 NOTE — TELEPHONE ENCOUNTER
Patient called in approx. 3:00 today said she had missed a call from Dr. Karri Guo. She had surgery yesterday and thinks might of been a well check call. Can return the call to [phone] she had been taking a nap and missed the call.

## 2022-07-14 NOTE — TELEPHONE ENCOUNTER
Note was routed to Dr. Jace Booth, not sure if this has been completed patient made aware of results.

## 2022-07-18 NOTE — TELEPHONE ENCOUNTER
There's a note on pathology. Dr. Mary Licea spoke with patient. Please sign this note.        Thanks, Benson Stahl

## 2022-07-27 ENCOUNTER — OFFICE VISIT (OUTPATIENT)
Dept: BREAST CENTER | Age: 52
End: 2022-07-27

## 2022-07-27 VITALS — DIASTOLIC BLOOD PRESSURE: 65 MMHG | SYSTOLIC BLOOD PRESSURE: 104 MMHG | HEART RATE: 61 BPM

## 2022-07-27 DIAGNOSIS — R59.0 LYMPHADENOPATHY, AXILLARY: Primary | ICD-10-CM

## 2022-07-27 PROCEDURE — 99024 POSTOP FOLLOW-UP VISIT: CPT | Performed by: SURGERY

## 2022-07-27 NOTE — PATIENT INSTRUCTIONS
Incision is healing well  May begin start gently massaging area with lotion daily for a few weeks  Continue with monthly self breast exams  Continue with yearly screening mammograms    Healthy Lifestyle Recommendations: healthy diet (decrease consumption of red meat, increase fresh fruits and vegetables), decreased alcohol consumption (less than 4 drinks/week), adequate sleep (goal 6-8 hours), routine exercise (goal 150 minutes/week or greater), weight control.

## 2022-07-27 NOTE — PROGRESS NOTES
Subjective:      Patient ID: Wade Whitehead is a 47 y.o. female. HPI   Chief Complaint   Patient presents with    Post-Op Check     Patient presents post op right axillary biopsy     Patient is s/p excision of right axillary lymph node, path benign node and ectopic breast tissue. Wound healing well, instructed on wound care. Continue monthly self breast exam, yearly mammogram. Follow up as needed. 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 Right     biopsy     US LYMPH NODE BIOPSY  5/23/2022    US LYMPH NODE BIOPSY 5/23/2022 [address] [facility]       Current Outpatient Medications   Medication Sig Dispense Refill    lactase (LACTAID) 3000 units tablet Take 1 tablet by mouth as needed Indications: when drinks milk       No current facility-administered medications for this visit.        Social History     Socioeconomic History    Marital status:      Spouse name: Not on file    Number of children: Not on file    Years of education: Not on file    Highest education level: Not on file   Occupational History    Not on file   Tobacco Use    Smoking status: Never    Smokeless tobacco: Never   Vaping Use    Vaping Use: Never used   Substance and Sexual Activity    Alcohol use: Not Currently    Drug use: Never    Sexual activity: Not Currently   Other Topics Concern    Not on file   Social History Narrative    Not on file     Social Determinants of Health     Financial Resource Strain: Not on file   Food Insecurity: Not on file   Transportation Needs: Not on file   Physical Activity: Not on file   Stress: Not on file   Social Connections: Not on file   Intimate Partner Violence: Not on file   Housing Stability: Not on file       Objective:   Physical Exam         Assessment:      Diagnosis Orders   1.  Lymphadenopathy, axillary               Plan:     See above       Electronically signed by She Ambriz MD on 7/27/2022 at 12:40 PM

## 2024-02-16 NOTE — PROGRESS NOTES
Message received:  I wanted to touch base with you to ask you if you have a recommendation for a Dermatologist (probably prefer a male) just for me to go in for a yearly type of exam. I don't have any actual concerns at this time, but thought it might be a good idea for me to do just to be on the safe side.     Please advise.    Subjective:      Patient ID: Frankie Damon is a 47 y.o. female. HPI   Chief Complaint   Patient presents with   [facility] Surgical Consult     Patient referred by Dr Jeyson Piña for right axillary mass. Patient had felt a mass in her right axilla for the past couple of years. She had a recent mammogram 4/2022 ([facility]) showing heterogeneously dense breast tissue and a mass in the right axilla. Ultrasound showed a 15 x 7 mm node. She underwent core biopsy of the node at [facility], path showed lymph node tissue, negative for carcinoma and melanoma, concordant. Surgical excision of the node was recommended for further evaluation. Patient had COVID booster in left arm last December. Her mother had breast cancer at age 76. No past medical history on file. Past Surgical History:   Procedure Laterality Date    US LYMPH NODE BIOPSY  5/23/2022    US LYMPH NODE BIOPSY 5/23/2022 [facility]       No current outpatient medications on file. No current facility-administered medications for this visit. Social History     Socioeconomic History    Marital status:      Spouse name: Not on file    Number of children: Not on file    Years of education: Not on file    Highest education level: Not on file   Occupational History    Not on file   Tobacco Use    Smoking status: Not on file    Smokeless tobacco: Not on file   Substance and Sexual Activity    Alcohol use: Not on file    Drug use: Not on file    Sexual activity: Not on file   Other Topics Concern    Not on file   Social History Narrative    Not on file     Social Determinants of Health     Financial Resource Strain:     Difficulty of Paying Living Expenses: Not on file   Food Insecurity:     Worried About Running Out of Food in the Last Year: Not on file    Lucia of Food in the Last Year: Not on file   Transportation Needs:     Lack of Transportation (Medical): Not on file    Lack of Transportation (Non-Medical):  Not on file Physical Activity:     Days of Exercise per Week: Not on file    Minutes of Exercise per Session: Not on file   Stress:     Feeling of Stress : Not on file   Social Connections:     Frequency of Communication with Friends and Family: Not on file    Frequency of Social Gatherings with Friends and Family: Not on file    Attends Taoist Services: Not on file    Active Member of [address] [place] [address] [employer] or Organizations: Not on file    Attends Club or Organization Meetings: Not on file    Marital Status: Not on file   Intimate Partner Violence:     Fear of Current or Ex-Partner: Not on file    Emotionally Abused: Not on file    Physically Abused: Not on file    Sexually Abused: Not on file   Housing Stability:     Unable to Pay for Housing in the Last Year: Not on file    Number of [place] in the Last Year: Not on file    Unstable Housing in the Last Year: Not on file       Objective:   Physical Exam    Bilateral breasts - Normal contour, no masses, no nipple or skin changes. Right axilla - 1.2 cm mass lower mid axilla, smooth, mobile, nontender. No other masses. Assessment:      Diagnosis Orders   1. Lymphadenopathy, axillary            Plan:     I recommend we proceed with excisional biopsy of her enlarged right axillary node. The indications for the planned procedure, along with the potential benefits and risks which include but are not limited to the risk of anesthesia, bleeding, infection, lymphedema, sensation changes, nerve injury, persistent pain, and unappealing cosmetics were reviewed. The discussion I have done encompasses risks, benefits, and side effects related to the alternatives and the risks related to not receiving the proposed care, treatment, and services. All questions were answered and she agrees to proceed.     On this date 06/01/22 I have spent 60 minutes reviewing previous notes, test results, and face to face with the patient discussing the diagnosis and importance of compliance with the treatment plan as well as documenting on the day of the visit.      Electronically signed by Jayme Justice MD on 6/1/2022 at 3:06 PM

## (undated) DEVICE — SUTURE PERMAHAND SZ 2-0 L18IN NONABSORBABLE BLK L26MM FS 685G

## (undated) DEVICE — STRIP,CLOSURE,WOUND,MEDI-STRIP,1/2X4: Brand: MEDLINE

## (undated) DEVICE — Z DISCONTINUED USE 2272117 DRAPE SURG 3 QTR N INVASIVE 2 LAYR DISP

## (undated) DEVICE — SYRINGE MED 5ML STD CLR PLAS LUERLOCK TIP N CTRL DISP

## (undated) DEVICE — MINOR SET UP: Brand: MEDLINE INDUSTRIES, INC.

## (undated) DEVICE — GLOVE ORANGE PI 7   MSG9070

## (undated) DEVICE — SUTURE VCRL + SZ 3-0 L27IN ABSRB UD L26MM SH 1/2 CIR VCP416H

## (undated) DEVICE — SPONGE GZ W4XL4IN COT 12 PLY TYP VII WVN C FLD DSGN

## (undated) DEVICE — SHEARS ENDOSCP L9CM CRV HARM FOCS +

## (undated) DEVICE — 3M™ TEGADERM™ TRANSPARENT FILM DRESSING FRAME STYLE, 1626W, 4 IN X 4-3/4 IN (10 CM X 12 CM), 50/CT 4CT/CASE: Brand: 3M™ TEGADERM™

## (undated) DEVICE — DRAPE,T,LAPARO,TRANS,STERILE: Brand: MEDLINE

## (undated) DEVICE — SOLUTION IV IRRIG POUR BRL 0.9% SODIUM CHL 2F7124

## (undated) DEVICE — APPLIER LIG CLP M L11IN TI STR RNG HNDL FOR 20 CLP DISP

## (undated) DEVICE — SYRINGE MED 10ML LUERLOCK TIP W/O SFTY DISP

## (undated) DEVICE — 3M™ STERI-STRIP™ COMPOUND BENZOIN TINCTURE 40 BAGS/CARTON 4 CARTONS/CASE C1544: Brand: 3M™ STERI-STRIP™

## (undated) DEVICE — SUTURE PERMAHAND SZ 3-0 L30IN NONABSORBABLE BLK SILK BRAID A304H

## (undated) DEVICE — CANISTER, RIGID, 1200CC: Brand: MEDLINE INDUSTRIES, INC.

## (undated) DEVICE — PTFE COATED BLADE 2.75': Brand: MEDLINE

## (undated) DEVICE — SPONGE LAP W18XL18IN WHT COT 4 PLY FLD STRUNG RADPQ DISP ST

## (undated) DEVICE — SUTURE VCRL + SZ 4-0 L18IN ABSRB UD L19MM PS-2 3/8 CIR PRIM VCP496H

## (undated) DEVICE — RESERVOIR,SUCTION,100CC,SILICONE: Brand: MEDLINE

## (undated) DEVICE — SUTURE PERMAHAND SZ 2-0 L30IN NONABSORBABLE BLK SILK W/O A305H

## (undated) DEVICE — NEEDLE HYPO 25GA L1.5IN BVL ORIENTED ECLIPSE

## (undated) DEVICE — DRAIN SURG W10MMXL20CM SIL FULL PERF HUBLESS FLAT RADPQ

## (undated) DEVICE — TUBING, SUCTION, 1/4" X 12', STRAIGHT: Brand: MEDLINE